# Patient Record
Sex: MALE | Race: WHITE | Employment: FULL TIME | URBAN - METROPOLITAN AREA
[De-identification: names, ages, dates, MRNs, and addresses within clinical notes are randomized per-mention and may not be internally consistent; named-entity substitution may affect disease eponyms.]

---

## 2018-09-07 ENCOUNTER — OFFICE VISIT (OUTPATIENT)
Dept: HEMATOLOGY | Age: 63
End: 2018-09-07

## 2018-09-07 VITALS
HEART RATE: 68 BPM | DIASTOLIC BLOOD PRESSURE: 85 MMHG | HEIGHT: 70 IN | SYSTOLIC BLOOD PRESSURE: 149 MMHG | OXYGEN SATURATION: 98 % | WEIGHT: 192.6 LBS | TEMPERATURE: 96.9 F | BODY MASS INDEX: 27.57 KG/M2

## 2018-09-07 DIAGNOSIS — B18.2 CHRONIC HEPATITIS C WITHOUT HEPATIC COMA (HCC): Primary | ICD-10-CM

## 2018-09-07 NOTE — MR AVS SNAPSHOT
[address] [facility] [phone] [address] [place] [address] 
[phone] Patient: Seamus Martinez MRN: [id number] :1955 Visit Information Date & Time Provider Department Dept. Phone Encounter #  
 2018  8:45 AM Natalee Aceolic, [address] [facility]  [place] 219 [id number] Upcoming Health Maintenance Date Due Pneumococcal 19-64 Medium Risk (1 of 1 - PPSV23) 10/19/1974 DTaP/Tdap/Td series (1 - Tdap) 10/19/1976 FOBT Q 1 YEAR AGE 50-75 10/19/2005 ZOSTER VACCINE AGE 60> 2015 Influenza Age 5 to Adult 2018 Allergies as of 2018  Review Complete On: 2018 By: Tiff Mora LPN No Known Allergies Current Immunizations  Never Reviewed No immunizations on file. Not reviewed this visit Vitals BP Pulse Temp Height(growth percentile) 149/85 (BP 1 Location: Left arm, BP Patient Position: Sitting) 68 96.9 °F (36.1 °C) (Tympanic) 5' 10\" (1.778 m) Weight(growth percentile) SpO2 BMI Smoking Status 192 lb 9.6 oz (87.4 kg) 98% 27.64 kg/m2 Current Every Day Smoker Vitals History BMI and BSA Data Body Mass Index Body Surface Area  
 27.64 kg/m 2 2.08 m 2 Your Updated Medication List  
  
   
This list is accurate as of 18  9:21 AM.  Always use your most recent med list.  
  
  
  
  
 cilostazol 50 mg tablet Commonly known as:  PLETAL Take  by mouth Before breakfast and dinner. clopidogrel 75 mg Tab Commonly known as:  PLAVIX Take  by mouth daily. rosuvastatin 20 mg tablet Commonly known as:  CRESTOR Take 20 mg by mouth nightly. Introducing [facility]! [employer] introduces [employer] patient portal. Now you can access parts of your medical record, email your doctor's office, and request medication refills online. 1. In your internet browser, go to [email or web address] 2. Click on the First Time User? Click Here link in the Sign In box. You will see the New Member Sign Up page. 3. Enter your [employer]Base Access Code exactly as it appears below. You will not need to use this code after youve completed the sign-up process. If you do not sign up before the expiration date, you must request a new code. · echoBase Access Code: [email or web address] Expires: 12/6/2018  9:21 AM 
 
4. Enter the last four digits of your Social Security Number (xxxx) and Date of Birth (mm/dd/yyyy) as indicated and click Submit. You will be taken to the next sign-up page. 5. Create a echoBase ID. This will be your echoBase login ID and cannot be changed, so think of one that is secure and easy to remember. 6. Create a [employer]Base password. You can change your password at any time. 7. Enter your Password Reset Question and Answer. This can be used at a later time if you forget your password. 8. Enter your e-mail address. You will receive e-mail notification when new information is available in [address] 9. Click Sign Up. You can now view and download portions of your medical record. 10. Click the Download Summary menu link to download a portable copy of your medical information. If you have questions, please visit the Frequently Asked Questions section of the echoBase website. Remember, echoBase is NOT to be used for urgent needs. For medical emergencies, dial 911. Now available from your iPhone and Android! Please provide this summary of care documentation to your next provider. Your primary care clinician is listed as [place]. If you have any questions after today's visit, please call [phone].

## 2018-09-07 NOTE — PROGRESS NOTES
Chief Complaint   Patient presents with    Follow-up     fibroscan     Visit Vitals    /85 (BP 1 Location: Left arm, BP Patient Position: Sitting)    Pulse 68    Temp 96.9 °F (36.1 °C) (Tympanic)    Ht 5' 10\" (1.778 m)    Wt 192 lb 9.6 oz (87.4 kg)    SpO2 98%    BMI 27.64 kg/m2     PHQ over the last two weeks 9/7/2018   Little interest or pleasure in doing things Not at all   Feeling down, depressed, irritable, or hopeless Not at all   Total Score PHQ 2 0     1. Have you been to the ER, urgent care clinic since your last visit? Hospitalized since your last visit? No    2. Have you seen or consulted any other health care providers outside of the [address] [place] [address] since your last visit? Include any pap smears or colon screening.  No

## 2018-09-07 NOTE — PROGRESS NOTES
70 Edgar Winters MD, [address], [place], [place]       FELIPA Lara Head, VIRGEN Dahl Officer, [facility]   FELIPA Chavira NP [address] [facility] 136    at [address]    [facility], [address] [address]  [address].    [phone]    FAX: [address] [place] [address], [address]    [phone]    FAX: [phone]     Patient Care Team:  Pablito Santos MD as PCP - General (Family Practice)  Janelle Bartlett MD as Physician (Gastroenterology)    Patient Active Problem List   Diagnosis Code    Hypercholesterolemia E78.00    Chronic [facility] without hepatic [facility] ([facility] B18.2     The physicians listed above have asked me to see Mayte Knott in consultation regarding chronic HCV and to perform assessment of fibrosis by means of in-office FibroScan analysis. The patient is a 58 y.o. male who was diagnosed with liver disease in 2013. The patient has pruritic lesions on both hands secondary to chronic HCV . The patient completes all daily activities without any functional limitations. Today, patient denies abdominal pain, change in bowel habits, dark urine, myalgias, arthralgias, pruritus and problems concentrating. ALLERGIES:  No Known Allergies    MEDICATIONS:  Current Outpatient Prescriptions   Medication Sig    clopidogrel (PLAVIX) 75 mg tablet Take  by mouth daily.  cilostazol (PLETAL) 50 mg tablet Take  by mouth Before breakfast and dinner.  rosuvastatin (CRESTOR) 20 mg tablet Take 20 mg by mouth nightly. No current facility-administered medications for this visit.       SYSTEM REVIEW NOT RELATED TO LIVER DISEASE OR REVIEWED ABOVE:  Constitution systems: Negative for fever, chills, weight gain, weight loss. Eyes: Negative for visual changes. ENT: Negative for sore throat, painful swallowing. Respiratory: Negative for cough, hemoptysis, SOB. Cardiology: Negative for chest pain, palpitations. GI:  Negative for constipation or diarrhea. : Negative for urinary frequency, dysuria, hematuria, nocturia. Skin: Lesions on both hands. Hematology: Negative for easy bruising, blood clots. Musculo-skeletal: Negative for back pain, muscle pain, weakness. Neurologic: Negative for headaches, dizziness, vertigo, memory problems not related to HE. Psychology: Negative for anxiety, depression. FAMILY HISTORY:  There is no family history of liver disease. SOCIAL HISTORY:  The patient is . The patient has 2 children, 2 stepchildren, and 3 grandchildren. The patient currently smokes 1-2 packs of tobacco daily. The patient consumes alcohol on social occasions never in excess. The patient currently works full time. PHYSICAL EXAMINATION:  Visit Vitals    /85 (BP 1 Location: Left arm, BP Patient Position: Sitting)    Pulse 68    Temp 96.9 °F (36.1 °C) (Tympanic)    Ht 5' 10\" (1.778 m)    Wt 192 lb 9.6 oz (87.4 kg)    SpO2 98%    BMI 27.64 kg/m2     General: No acute distress. Skin: + dime-sized dried lesions on both hands dorsally. No spider angiomata. No jaundice. No palmar erythema. Abdomen: Soft non-tender. Liver size normal to percussion/palpation. Spleen not palpable. No obvious ascites. Extremities: No edema. No muscle wasting. No gross arthritic changes. Neurologic: Alert and oriented. Cranial nerves grossly intact. No asterixis.     LABORATORY:  Liver [facility] Latest Ref Rng 6/1/2016   WBC 3.4 - 10.8 x10E3/uL 8.1   ANC 1.4 - 7.0 x10E3/uL 4.0   HGB 12.6 - 17.7 g/dL 16.9    - 379 x10E3/uL 278   AST 0 - 40 IU/L 35   ALT 0 - 44 IU/L 39   Alk Phos 39 - 117 IU/L 81   Bili, Total 0.0 - 1.2 mg/dL 0.7   Albumin 3.6 - 4.8 g/dL 4.3 BUN 8 - 27 mg/dL 6 (L)   Creat 0.76 - 1.27 mg/dL 0.79   Na 134 - 144 mmol/L 143   K 3.5 - 5.2 mmol/L 4.8   Cl 97 - 108 mmol/L 104   CO2 18 - 29 mmol/L 27   Glucose 65 - 99 mg/dL 96     LIVER HISTOLOGY:  9/2018. FibroScan performed at [place] [address]. EkPa was 5.4. IQR: 15%. This suggests in the setting of chronic HCV to be F1. CAP was 287, consistent with fatty liver disease. 6/1/2016. [facility]. EkPa was 5.1. Suggested fibrosis level is F0. ASSESSMENT AND PLAN:  Chronic HCV with portal fibrosis. The patient expressed concern over coverage of HCV medications with no or minimal liver damage. Almost all insurances cover hep C medications now as coverage has vastly improved. Even Medicaid, which was the last to hold out, now cover F0 and up. PCT. This is secondary to chronic HCV. Once the HCV is eradicated, this will significantly improve and should resolve. Patient should be treated regardless of fibrosis score. Fatty liver disease. Unknown if this will progress to cirrhosis. Currently, there is no FDA approved medication for fatty liver. The only treatment is weight loss. The patient was educated on how best to lose weight in fatty liver disease - high protein, low carbohydrates. This was discussed in detail and a hand out was provided. Assessment of fibrosis was made through performance of FibroScan in the office today. The results of the analysis were shared with the patient in the office at the time of the procedure. A copy of the report was provided to the patient and will be forwarded to the referring medical practice. All questions were answered. The patient expressed a clear understanding of the above. Follow-up with referring physician.     Robbie Charles, [facility]  [facility] [address] [place], [address] [address]  [address]. [phone]

## 2020-07-07 ENCOUNTER — HOSPITAL ENCOUNTER (OUTPATIENT)
Dept: CT IMAGING | Age: 65
Discharge: HOME | End: 2020-07-07
Attending: PHYSICIAN ASSISTANT
Payer: COMMERCIAL

## 2020-07-07 DIAGNOSIS — R05.3 CHRONIC COUGH: ICD-10-CM

## 2020-07-07 PROCEDURE — 71250 CT THORAX DX C-: CPT

## 2023-05-10 RX ORDER — CLOPIDOGREL BISULFATE 75 MG/1
TABLET ORAL DAILY
COMMUNITY

## 2023-05-10 RX ORDER — ROSUVASTATIN CALCIUM 20 MG/1
20 TABLET, COATED ORAL NIGHTLY
COMMUNITY

## 2023-05-10 RX ORDER — CILOSTAZOL 50 MG/1
TABLET ORAL
COMMUNITY